# Patient Record
Sex: FEMALE | Race: WHITE | NOT HISPANIC OR LATINO | URBAN - METROPOLITAN AREA
[De-identification: names, ages, dates, MRNs, and addresses within clinical notes are randomized per-mention and may not be internally consistent; named-entity substitution may affect disease eponyms.]

---

## 2018-11-14 ENCOUNTER — EMERGENCY (EMERGENCY)
Facility: HOSPITAL | Age: 53
LOS: 0 days | Discharge: HOME | End: 2018-11-14
Attending: EMERGENCY MEDICINE
Payer: MEDICARE

## 2018-11-14 VITALS
HEART RATE: 72 BPM | SYSTOLIC BLOOD PRESSURE: 130 MMHG | TEMPERATURE: 98 F | RESPIRATION RATE: 19 BRPM | DIASTOLIC BLOOD PRESSURE: 81 MMHG | OXYGEN SATURATION: 100 %

## 2018-11-14 VITALS — WEIGHT: 160.06 LBS

## 2018-11-14 PROCEDURE — 99053 MED SERV 10PM-8AM 24 HR FAC: CPT

## 2018-11-14 PROCEDURE — 99283 EMERGENCY DEPT VISIT LOW MDM: CPT

## 2018-11-14 RX ORDER — CYCLOBENZAPRINE HYDROCHLORIDE 10 MG/1
1 TABLET, FILM COATED ORAL
Qty: 9 | Refills: 0 | OUTPATIENT
Start: 2018-11-14 | End: 2018-11-16

## 2018-11-14 RX ORDER — CYCLOBENZAPRINE HYDROCHLORIDE 10 MG/1
10 TABLET, FILM COATED ORAL ONCE
Qty: 0 | Refills: 0 | Status: COMPLETED | OUTPATIENT
Start: 2018-11-14 | End: 2018-11-14

## 2018-11-14 RX ORDER — LIDOCAINE 4 G/100G
1 CREAM TOPICAL ONCE
Qty: 0 | Refills: 0 | Status: COMPLETED | OUTPATIENT
Start: 2018-11-14 | End: 2018-11-14

## 2018-11-14 RX ORDER — IBUPROFEN 200 MG
600 TABLET ORAL ONCE
Qty: 0 | Refills: 0 | Status: COMPLETED | OUTPATIENT
Start: 2018-11-14 | End: 2018-11-14

## 2018-11-14 RX ORDER — ALBUTEROL 90 UG/1
2 AEROSOL, METERED ORAL
Qty: 1 | Refills: 0 | OUTPATIENT
Start: 2018-11-14 | End: 2018-12-13

## 2018-11-14 RX ORDER — TRAMADOL HYDROCHLORIDE 50 MG/1
50 TABLET ORAL ONCE
Qty: 0 | Refills: 0 | Status: DISCONTINUED | OUTPATIENT
Start: 2018-11-14 | End: 2018-11-14

## 2018-11-14 RX ADMIN — LIDOCAINE 1 PATCH: 4 CREAM TOPICAL at 08:04

## 2018-11-14 RX ADMIN — CYCLOBENZAPRINE HYDROCHLORIDE 10 MILLIGRAM(S): 10 TABLET, FILM COATED ORAL at 08:04

## 2018-11-14 RX ADMIN — TRAMADOL HYDROCHLORIDE 50 MILLIGRAM(S): 50 TABLET ORAL at 08:03

## 2018-11-14 RX ADMIN — Medication 600 MILLIGRAM(S): at 08:04

## 2018-11-14 NOTE — ED PROVIDER NOTE - CARE PLAN
Principal Discharge DX:	Sciatica of left side  Assessment and plan of treatment:	1. return for worsening symptoms or anything concerning to you  2. take all home meds as prescribed  3. follow up with your pmd call to make an appointment  4. Take Tylenol 650 mg every 6 hours as needed for pain.  5. Take motrin 600mg PO Q6 hours prn pain  6. use flexeril as needed for spasms as directed do not drive while taking this medication  7. use lidocaine patches  at pharmacy  8 follow up with Spine Dr. Franks see attached call to make an appointment

## 2018-11-14 NOTE — ED PROVIDER NOTE - OBJECTIVE STATEMENT
53F hx chronic back pain presents to the ED with worsening back pain. pt states that over the past few days she was doing heavy lifting of boxes and her pain started. pain located in left lower back and radiates down leg. associated with mild numbness to left outer leg. feels like previous sciatica. no fevers, night sweats, weight loss, hx Cancer, IVDU, dysuria, urinary retention/incontinence, or numbness in groin. able to ambulate. no abdominal pain nausea or vomiting.

## 2018-11-14 NOTE — ED ADULT NURSE NOTE - OBJECTIVE STATEMENT
pt came to Ed for further evaluation of back pain, pt endorses 9/10 back pain radiating to left leg- pt states she has chronic pain and previous back surgeries. pt states the pain has become worse the last few days- pt states" its tough to breathe". pt in no resp distress, resting comfortably in stretcher

## 2018-11-14 NOTE — ED ADULT TRIAGE NOTE - CHIEF COMPLAINT QUOTE
Pt presents to ED c/o mid and lower back pain. Pt reports she coughed and felt the pain in her back. Pt reports history of several spinal surgeries and sciatica.

## 2018-11-14 NOTE — ED PROVIDER NOTE - MEDICAL DECISION MAKING DETAILS
53F hx chronic back pain presents to the ED with worsening back pain. pt states that over the past few days she was doing heavy lifting of boxes and her pain started. pain located in left lower back and radiates down leg. associated with mild numbness to left outer leg. feels like previous sciatica. no fevers, night sweats, weight loss, hx Cancer, IVDU, dysuria, urinary retention/incontinence, or numbness in groin. able to ambulate. no abdominal pain nausea or vomiting. exam with  no midline spinal ttp. + slr on left and crossed slr. exam is consistent with sciatica. no red flags. very low suspicion for AAA, dissection, epidural abscess/hematoma, cauda equina. will treat symptoms and have pt follow up with spine. Cedric Martin M.D., Attending Physician

## 2018-11-14 NOTE — ED PROVIDER NOTE - PHYSICAL EXAMINATION
Constitutional: NAD AAOx3  Eyes: PERRLA EOMI  Head: Normocephalic atraumatic  Mouth: MMM  Cardiac: regular rate  normal peripheral pulses  Resp: Lungs CTAB  GI: Abd s/nt/nd no rebound or guarding no pulsatile mass no cvat  Neuro: CN2-12 intact normal strength and sensation  Skin: No rashes   msk: no midline spinal ttp. + slr on left and crossed slr

## 2018-11-14 NOTE — ED ADULT NURSE NOTE - NSIMPLEMENTINTERV_GEN_ALL_ED
Implemented All Universal Safety Interventions:  [place] to call system. Call bell, personal items and telephone within reach. Instruct patient to call for assistance. Room bathroom lighting operational. Non-slip footwear when patient is off stretcher. Physically safe environment: no spills, clutter or unnecessary equipment. Stretcher in lowest position, wheels locked, appropriate side rails in place.

## 2018-11-14 NOTE — ED PROVIDER NOTE - PLAN OF CARE
1. return for worsening symptoms or anything concerning to you  2. take all home meds as prescribed  3. follow up with your pmd call to make an appointment  4. Take Tylenol 650 mg every 6 hours as needed for pain.  5. Take motrin 600mg PO Q6 hours prn pain  6. use flexeril as needed for spasms as directed do not drive while taking this medication  7. use lidocaine patches  at pharmacy  8 follow up with Spine Dr. Franks see attached call to make an appointment

## 2018-11-15 DIAGNOSIS — F17.200 NICOTINE DEPENDENCE, UNSPECIFIED, UNCOMPLICATED: ICD-10-CM

## 2018-11-15 DIAGNOSIS — F31.9 BIPOLAR DISORDER, UNSPECIFIED: ICD-10-CM

## 2018-11-15 DIAGNOSIS — M54.32 SCIATICA, LEFT SIDE: ICD-10-CM

## 2018-11-15 DIAGNOSIS — M54.9 DORSALGIA, UNSPECIFIED: ICD-10-CM

## 2018-11-15 DIAGNOSIS — Z88.0 ALLERGY STATUS TO PENICILLIN: ICD-10-CM

## 2021-01-08 NOTE — ED ADULT NURSE NOTE - NS ED NURSE LEVEL OF CONSCIOUSNESS SPEECH
PATIENT REQUESTS REFILL ON SEROQUEL. [facility]  
Refused.  You were given a 90-day supply of this medication on 11/12/2020.  This suggests that you should have enough of this medication to make it until next month(unless you are taking too much or diverting it).  She will need to follow-up with psychiatry.
SPOKE WITH PATIENT, SHE STATES SHE LOST HER SEROQUEL AT HER MOTHER'S HOUSE. INFORMED PATIENT WE DO NOT REPLACE LOST OR STOLEN MEDICATIONS. SHE WILL NEED TO DISCUSS AT NEXT VISIT WITH DR ALEX, REGARDING A REFILL OF THIS MED. PATIENT VERBALIZED UNDERSTANDING.  
Speaking Coherently